# Patient Record
Sex: FEMALE | Race: WHITE
[De-identification: names, ages, dates, MRNs, and addresses within clinical notes are randomized per-mention and may not be internally consistent; named-entity substitution may affect disease eponyms.]

---

## 2017-01-17 NOTE — RAD
EXAM DESCRIPTION:

XR PELVIS 1-2 VIEWS



CLINICAL HISTORY:

PELVIC PAIN



COMPARISON:

None Available.



TECHNIQUE:

AP pelvis



FINDINGS:

Mild degenerative changes are observed the lower lumbar spine. Mild

degenerative changes are observed in the sacroiliac joints. The proximal

femurs are normal.  Small phleboliths are observed in the pelvis.  No

lytic or blastic abnormality is seen.



IMPRESSION:

Mild degenerative changes are observed in the sacroiliac joints and lower

lumbar spine. Exam is otherwise unremarkable.



Electronically signed by: Neeraj Lucas MD 01/17/2017 09:21

## 2017-01-17 NOTE — RAD
EXAM DESCRIPTION:

XR HIP 2 OR MORE VIEWS



CLINICAL HISTORY:

HIP PAIN



COMPARISON:

None Available.



TECHNIQUE:

AP/frog leg lateral



FINDINGS:

There is no bone, joint, or soft tissue abnormality.



IMPRESSION:

Normal left hip



Electronically signed by: Neeraj Lucas MD 01/17/2017 09:20

## 2017-05-01 NOTE — US
EXAM DESCRIPTION:



Venous,Lower Extremity LT



CLINICAL HISTORY:



77 years, Female, DVT pain and swelling



COMPARISON:



None.



FINDINGS:

The left common femoral, superficial femoral, deep femoral,

popliteal, posterior tibial and peroneal veins identified.

Appropriate flow compressibility and augmentation.



IMPRESSION:



No evidence deep venous thrombosis left lower extremity.







Electronically signed by:  Osvaldo Rodríguez MD  5/1/2017 7:05 PM CDT

## 2017-05-01 NOTE — US
EXAM DESCRIPTION:



Venous, lower Extremity RT, 

CLINICAL HISTORY:



77 years, Female, DVT pain





COMPARISON:



The right common femoral, superficial femoral, deep femoral,

popliteal, posterior tibial and peroneal veins identified.

Appropriate flow compressibility and augmentation.



IMPRESSION:



No evidence deep venous thrombosis right lower extremity.



Electronically signed by:  Osvaldo Rodríguez MD  5/1/2017 7:05 PM CDT

## 2017-10-23 NOTE — MRI
Procedure:  MR BRAIN WITHOUT IV CONTRAST        



Exam Date:  10/20/2017 12:00 AM CDT



Ordering Provider:  Rinku Lora



Clinical Indication:  ACUTE CONFUSION



Comparison: Head CT 8/17/2014



Technique:  Multiplanar MRI of the brain was obtained without.

the administration of IV contrast. 



Findings: 



scattered restricted diffusion seen within the periventricular

white matter of the posterior right temporal lobe and right

occipital lobe. There is also involvement of the posterior limb

right internal capsule as well as right aspect of the splenule of

the corpus callosum. Findings are compatible with a PCA territory

distribution  acute infarction



T2/FLAIR signal abnormality seen in the bihemispheric white

matter, nonspecific yet most likely microvascular ischemic

change. 



Ventricular size and configuration are normal. There is no

midline shift or hydrocephalus. 





There is no parenchymal hemorrhage. 

The pituitary gland is normal in size. There are no pineal

masses.

There are normal intracranial vascular flow voids. 



The foramen magnum is normal.

There is normal signal within the paranasal sinuses. 

The orbits are intact.





IMPRESSION:



1. Scattered predominately subcortical regions of acute

infarction seen within the right PCA vascular distribution. No

evidence of acute intracranial hemorrhage, mass effect, midline

shift, or hydrocephalus.

2. Microvascular ischemic changes.



Electronically signed by:  Javier Del Real MD  10/23/2017 6:23 AM

[facility] Workstation: [id number]

## 2017-12-18 NOTE — RAD
EXAM DESCRIPTION:

Pelvis



CLINICAL HISTORY:

77 years Female, HIP PAIN



COMPARISON:

January 17, 2017



FINDINGS:

Single view of the pelvis demonstrates the bony pelvic ring is

intact. Lower lumbar degenerative disc disease at L4-5 and L5-S1

is noted. No pelvic fracture or deformity or soft tissue mass

noted.



IMPRESSION:

Negative pelvis one view.



Electronically signed by:  Tor Shabazz MD  12/18/2017 4:23 PM

[facility] Workstation: [id number]

## 2017-12-18 NOTE — RAD
EXAM DESCRIPTION: 



Hip,Left 2 Views



CLINICAL HISTORY: 



HIP PAIN



COMPARISON: 



January 17, 2017



TECHNIQUE: 



AP/frog leg lateral



FINDINGS:

 

Left hip appears intact. The bones are well-mineralized. No

fracture or deformity is seen. No dislocation or severe

degenerative change seen.



IMPRESSION:

 

Negative left hip two views



Electronically signed by:  Tor Shabazz MD  12/18/2017 4:22 PM

[facility] Workstation: [id number]

## 2019-11-14 NOTE — RAD
Single radiograph pelvis. 2 radiographs left hip.



Indication: PAIN IN  LEFT HIP



Comparison: December 18, 2017



Impression:



No acute fracture the pelvis or left hip identified. Evaluation

for fracture is limited given the degree of osteopenia. If high

clinical concern for acute fracture, correlation with MRI

recommended given its greater sensitivity in the osteopenic

patient. If the patient cannot tolerate MRI imaging or more

urgent imaging is required, CT could be performed, however it is

less sensitive in the osteopenic patient when compared to MRI.



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

minimal osteophyte formation.



Mild pubic symphysis osteoarthritis.



Lower lumbar disc disease.



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



Osteopenia. If this is a new finding, DEXA scan recommended as

well as evaluation for possible osteoporosis treatment. 



Electronically signed by:  Geoff Graham MD  11/14/2019 11:40 AM

[facility] Workstation: [id number]

## 2019-11-14 NOTE — RAD
Single radiograph pelvis. 2 radiographs left hip.



Indication: PAIN IN  LEFT HIP



Comparison: December 18, 2017



Impression:



No acute fracture the pelvis or left hip identified. Evaluation

for fracture is limited given the degree of osteopenia. If high

clinical concern for acute fracture, correlation with MRI

recommended given its greater sensitivity in the osteopenic

patient. If the patient cannot tolerate MRI imaging or more

urgent imaging is required, CT could be performed, however it is

less sensitive in the osteopenic patient when compared to MRI.



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

minimal osteophyte formation.



Mild pubic symphysis osteoarthritis.



Lower lumbar disc disease.



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



Osteopenia. If this is a new finding, DEXA scan recommended as

well as evaluation for possible osteoporosis treatment. 



Electronically signed by:  Geoff Graham MD  11/14/2019 11:40 AM

[facility] Workstation: [id number]

## 2019-12-30 NOTE — RAD
EXAM DESCRIPTION:

Pelvis



CLINICAL HISTORY:

79 years Female, HIP PAIN



COMPARISON:

November 14, 2019



FINDINGS:

Single view of the pelvis demonstrates no fracture or dislocation

of the left or right hip region. Each superior and inferior pubic

ramus appears intact. The SI joints are normal in appearance.

Mild degenerative arthropathy is present at the symphysis and to

a lesser degree the hips. No evidence of osteonecrosis of either

hip noted.



IMPRESSION:

Mild degenerative changes involving the pelvis with no acute

abnormality noted. Moderate degenerative spine disease lower

lumbar spine also noted.



Electronically signed by:  Tor Shabazz MD  12/30/2019 9:03 AM

[facility] Workstation: [id number]

## 2019-12-30 NOTE — RAD
EXAM DESCRIPTION: 



Knee,Right Complete



CLINICAL HISTORY: 



79 years, Female, KNEE PAIN



COMPARISON: 



None



TECHNIQUE: 



 Four views right knee



FINDINGS: 



 Four views of the right knee demonstrate slight thickening of

the soft tissues in the suprapatellar region suggesting a mild

synovitis or minimal joint effusion. Very mild osteoarthritic

changes with marginal osteophyte formation particularly at the

superior patellar margin noted. Bones appear mildly osteopenic.

No fracture or dislocation or significant joint space narrowing

noted.



IMPRESSION: 



1.  Mild degenerative right knee with no acute abnormalities

noted.



Electronically signed by:  Tor Shabazz MD  12/30/2019 9:05 AM

[facility] Workstation: [id number]

## 2020-06-19 ENCOUNTER — HOSPITAL ENCOUNTER (OUTPATIENT)
Dept: HOSPITAL 39 - MAMMO | Age: 80
End: 2020-06-19
Attending: FAMILY MEDICINE
Payer: MEDICARE

## 2020-06-19 DIAGNOSIS — Z12.31: Primary | ICD-10-CM

## 2020-09-23 ENCOUNTER — HOSPITAL ENCOUNTER (OUTPATIENT)
Dept: HOSPITAL 39 - MRI | Age: 80
End: 2020-09-23
Attending: FAMILY MEDICINE
Payer: MEDICARE

## 2020-09-23 DIAGNOSIS — G43.109: Primary | ICD-10-CM

## 2020-09-23 DIAGNOSIS — R90.82: ICD-10-CM

## 2020-09-23 NOTE — MRI
EXAM DESCRIPTION: 

Brain w/o Contrast: MRI.



CLINICAL HISTORY: 

COMPLICATED MIGRAINE



COMPARISON: 

MRI scan of the brain without contrast October 2017



TECHNIQUE: 

Multiplanar, high-field MRI unit, multiple diffusion sequences,

multiple conventional sequences without contrast.



FINDINGS: 

Multifocal bilateral hyperintense FLAIR and T2-weighted signal in

the periventricular white matter and sub-cortical white matter 

most of the cerebral with relative sparing of the temporal lobes.

Most of the subcortical lesions at the levels of the ventricles

or more superior.. No hemorrhage, no cerebral edema, no midline

shift.. Normal signal in the bilateral basal ganglia.  Normal

signal in the brainstem and cerebellar hemispheres..

 

Concordance of the diffusion and non-diffusion sequences with no

diffusion restriction. Focal areas of diffusion restriction seen

abutting the right posterior lateral ventricle and occipital horn

and in the right temporal lobe on the prior study are not

visualized. Faint central hyperintense B 1000 diffusion signature

in the central josy is stable. Cortical sulci, ventricles, and

other CSF spaces, and the subdural spaces are normally configured

for patients age. No effacement or displacement. No midline

shift. No extra-axial hemorrhage.

 

Normal flow signal void in the major vessels of the [place]

[place], and the venous sinuses. IACs are symmetric bilaterally.

Normal signal in the bilateral mastoid air cells. No mass effect

in the bilateral cerebellopontine angles.   Pituitary gland

occupies most of the sella. Base of the cerebellar tonsils is at

the level of the foramen magnum. Minimal mucoperiosteal

thickening in the paranasal sinuses. Molly bullosa right middle

turbinate. Right septal deviation. Stable from the prior study..

The bony calvarium is intact.



IMPRESSION: 

1. Regions of diffusion restriction seen on the prior study in

the right temporal lobe and parietal lobe abutting the ventricle

and the occipital horn have resolved. No new regions are sites of

diffusion restriction. No hemorrhage or encephalomalacia.

2. Bilateral multifocal white matter disease involving the

periventricular white matter and  subcortical white matter are

stable since the prior study. No hemorrhage, no mass effect, no

midline shift. No extra-axial fluid or hemorrhage.



Electronically signed by:  Drew Trujillo MD  9/23/2020 3:52 PM CDT

Workstation: [id number]